# Patient Record
Sex: FEMALE | Employment: UNEMPLOYED | URBAN - METROPOLITAN AREA
[De-identification: names, ages, dates, MRNs, and addresses within clinical notes are randomized per-mention and may not be internally consistent; named-entity substitution may affect disease eponyms.]

---

## 2023-12-26 ENCOUNTER — TELEPHONE (OUTPATIENT)
Dept: ORTHOPEDIC SURGERY | Facility: HOSPITAL | Age: 68
End: 2023-12-26

## 2023-12-26 DIAGNOSIS — Z96.652 S/P TKR (TOTAL KNEE REPLACEMENT) USING CEMENT, LEFT: Primary | ICD-10-CM

## 2023-12-26 RX ORDER — CLINDAMYCIN HYDROCHLORIDE 300 MG/1
600 CAPSULE ORAL ONCE
Qty: 2 CAPSULE | Refills: 4 | Status: SHIPPED | OUTPATIENT
Start: 2023-12-26 | End: 2023-12-26

## 2024-04-30 ENCOUNTER — OFFICE VISIT (OUTPATIENT)
Dept: ORTHOPEDIC SURGERY | Facility: HOSPITAL | Age: 69
End: 2024-04-30
Payer: COMMERCIAL

## 2024-04-30 DIAGNOSIS — M79.642 LEFT HAND PAIN: Primary | ICD-10-CM

## 2024-04-30 PROCEDURE — 1159F MED LIST DOCD IN RCRD: CPT | Performed by: STUDENT IN AN ORGANIZED HEALTH CARE EDUCATION/TRAINING PROGRAM

## 2024-04-30 PROCEDURE — 99203 OFFICE O/P NEW LOW 30 MIN: CPT | Performed by: STUDENT IN AN ORGANIZED HEALTH CARE EDUCATION/TRAINING PROGRAM

## 2024-04-30 PROCEDURE — 99213 OFFICE O/P EST LOW 20 MIN: CPT | Performed by: STUDENT IN AN ORGANIZED HEALTH CARE EDUCATION/TRAINING PROGRAM

## 2024-04-30 RX ORDER — PANTOPRAZOLE SODIUM 40 MG/1
40 TABLET, DELAYED RELEASE ORAL
Status: ON HOLD | COMMUNITY
Start: 2024-04-02 | End: 2024-05-06

## 2024-04-30 RX ORDER — FLUTICASONE PROPIONATE 50 MCG
2 SPRAY, SUSPENSION (ML) NASAL DAILY
COMMUNITY

## 2024-04-30 RX ORDER — TRIAMTERENE AND HYDROCHLOROTHIAZIDE 75; 50 MG/1; MG/1
TABLET ORAL
Status: ON HOLD | COMMUNITY
Start: 2018-07-23 | End: 2024-05-06 | Stop reason: ENTERED-IN-ERROR

## 2024-04-30 RX ORDER — CLINDAMYCIN HYDROCHLORIDE 150 MG/1
CAPSULE ORAL
Status: ON HOLD | COMMUNITY
Start: 2016-05-05 | End: 2024-05-06 | Stop reason: ENTERED-IN-ERROR

## 2024-04-30 RX ORDER — PSEUDOEPHEDRINE HCL 30 MG
2 TABLET ORAL 2 TIMES DAILY
Status: ON HOLD | COMMUNITY
Start: 2024-03-15 | End: 2024-05-06 | Stop reason: ENTERED-IN-ERROR

## 2024-04-30 RX ORDER — PANTOPRAZOLE SODIUM 40 MG/1
40 TABLET, DELAYED RELEASE ORAL 2 TIMES DAILY
Status: ON HOLD | COMMUNITY
End: 2024-05-06 | Stop reason: ENTERED-IN-ERROR

## 2024-04-30 RX ORDER — NADOLOL 40 MG/1
40 TABLET ORAL DAILY
COMMUNITY

## 2024-04-30 RX ORDER — PANTOPRAZOLE SODIUM 20 MG/1
TABLET, DELAYED RELEASE ORAL
Status: ON HOLD | COMMUNITY
Start: 2019-11-21 | End: 2024-05-06 | Stop reason: ENTERED-IN-ERROR

## 2024-04-30 RX ORDER — TRIAMTERENE AND HYDROCHLOROTHIAZIDE 37.5; 25 MG/1; MG/1
1 CAPSULE ORAL
COMMUNITY
Start: 2024-04-02

## 2024-04-30 RX ORDER — CHOLECALCIFEROL (VITAMIN D3) 50 MCG
2000 TABLET ORAL
COMMUNITY

## 2024-04-30 RX ORDER — TOPIRAMATE 25 MG/1
TABLET ORAL
Status: ON HOLD | COMMUNITY
Start: 2019-11-21 | End: 2024-05-06 | Stop reason: ENTERED-IN-ERROR

## 2024-04-30 RX ORDER — ASCORBIC ACID 125 MG
TABLET,CHEWABLE ORAL
COMMUNITY
Start: 2019-11-21

## 2024-04-30 RX ORDER — WARFARIN 10 MG/1
TABLET ORAL
COMMUNITY
Start: 2024-04-22

## 2024-04-30 RX ORDER — CLINDAMYCIN HYDROCHLORIDE 300 MG/1
CAPSULE ORAL
COMMUNITY

## 2024-04-30 RX ORDER — DIPHENHYDRAMINE HYDROCHLORIDE 25 MG/1
CAPSULE ORAL
Status: ON HOLD | COMMUNITY
Start: 2024-02-20 | End: 2024-05-06 | Stop reason: ENTERED-IN-ERROR

## 2024-04-30 RX ORDER — PREDNISONE 20 MG/1
TABLET ORAL
Status: ON HOLD | COMMUNITY
Start: 2024-02-20 | End: 2024-05-06 | Stop reason: ENTERED-IN-ERROR

## 2024-04-30 RX ORDER — NADOLOL 20 MG/1
TABLET ORAL
Status: ON HOLD | COMMUNITY
Start: 2019-11-21 | End: 2024-05-06 | Stop reason: ENTERED-IN-ERROR

## 2024-04-30 RX ORDER — DOXYCYCLINE 100 MG/1
100 CAPSULE ORAL 2 TIMES DAILY
COMMUNITY
Start: 2024-04-10 | End: 2024-04-17

## 2024-04-30 RX ORDER — CALCIUM CARBONATE/VITAMIN D3 500 MG-10
TABLET,CHEWABLE ORAL
COMMUNITY
Start: 2024-03-27

## 2024-04-30 RX ORDER — METHYLPREDNISOLONE 4 MG/1
TABLET ORAL
Status: ON HOLD | COMMUNITY
Start: 2024-01-10 | End: 2024-05-06 | Stop reason: ENTERED-IN-ERROR

## 2024-04-30 RX ORDER — EZETIMIBE 10 MG/1
10 TABLET ORAL
Status: ON HOLD | COMMUNITY
Start: 2023-04-17 | End: 2024-05-06 | Stop reason: ENTERED-IN-ERROR

## 2024-04-30 RX ORDER — OXYCODONE AND ACETAMINOPHEN 5; 325 MG/1; MG/1
TABLET ORAL
Status: ON HOLD | COMMUNITY
Start: 2016-10-13 | End: 2024-05-06 | Stop reason: ENTERED-IN-ERROR

## 2024-04-30 RX ORDER — SILVER SULFADIAZINE 10 G/1000G
CREAM TOPICAL
COMMUNITY
Start: 2018-04-04

## 2024-04-30 ASSESSMENT — PAIN DESCRIPTION - DESCRIPTORS: DESCRIPTORS: ACHING

## 2024-04-30 ASSESSMENT — PAIN SCALES - GENERAL: PAINLEVEL_OUTOF10: 4

## 2024-04-30 ASSESSMENT — PAIN - FUNCTIONAL ASSESSMENT: PAIN_FUNCTIONAL_ASSESSMENT: 0-10

## 2024-05-05 ENCOUNTER — APPOINTMENT (OUTPATIENT)
Dept: RADIOLOGY | Facility: HOSPITAL | Age: 69
End: 2024-05-05
Payer: COMMERCIAL

## 2024-05-05 ENCOUNTER — HOSPITAL ENCOUNTER (OUTPATIENT)
Facility: HOSPITAL | Age: 69
Setting detail: OBSERVATION
LOS: 1 days | Discharge: HOME | End: 2024-05-06
Attending: STUDENT IN AN ORGANIZED HEALTH CARE EDUCATION/TRAINING PROGRAM | Admitting: HOSPITALIST
Payer: COMMERCIAL

## 2024-05-05 ENCOUNTER — APPOINTMENT (OUTPATIENT)
Dept: CARDIOLOGY | Facility: HOSPITAL | Age: 69
End: 2024-05-05
Payer: COMMERCIAL

## 2024-05-05 DIAGNOSIS — I49.8 BIGEMINY: Primary | ICD-10-CM

## 2024-05-05 DIAGNOSIS — R55 NEAR SYNCOPE: ICD-10-CM

## 2024-05-05 PROBLEM — Z86.79 HX OF ATRIAL FLUTTER: Status: ACTIVE | Noted: 2024-05-05

## 2024-05-05 PROBLEM — R42 DIZZINESS: Status: ACTIVE | Noted: 2024-05-05

## 2024-05-05 PROBLEM — R00.1 BRADYCARDIA: Status: ACTIVE | Noted: 2024-05-05

## 2024-05-05 LAB
ANION GAP SERPL CALC-SCNC: 10 MMOL/L (ref 10–20)
APPEARANCE UR: CLEAR
APTT PPP: 39 SECONDS (ref 27–38)
BASOPHILS # BLD AUTO: 0.01 X10*3/UL (ref 0–0.1)
BASOPHILS NFR BLD AUTO: 0.2 %
BILIRUB UR STRIP.AUTO-MCNC: NEGATIVE MG/DL
BNP SERPL-MCNC: 305 PG/ML (ref 0–99)
BUN SERPL-MCNC: 25 MG/DL (ref 6–23)
CALCIUM SERPL-MCNC: 8.8 MG/DL (ref 8.6–10.3)
CARDIAC TROPONIN I PNL SERPL HS: 6 NG/L (ref 0–13)
CARDIAC TROPONIN I PNL SERPL HS: 7 NG/L (ref 0–13)
CHLORIDE SERPL-SCNC: 103 MMOL/L (ref 98–107)
CO2 SERPL-SCNC: 27 MMOL/L (ref 21–32)
COLOR UR: ABNORMAL
CREAT SERPL-MCNC: 0.96 MG/DL (ref 0.5–1.05)
EGFRCR SERPLBLD CKD-EPI 2021: 65 ML/MIN/1.73M*2
EOSINOPHIL # BLD AUTO: 0.1 X10*3/UL (ref 0–0.7)
EOSINOPHIL NFR BLD AUTO: 1.7 %
ERYTHROCYTE [DISTWIDTH] IN BLOOD BY AUTOMATED COUNT: 14.4 % (ref 11.5–14.5)
GLUCOSE SERPL-MCNC: 97 MG/DL (ref 74–99)
GLUCOSE UR STRIP.AUTO-MCNC: NORMAL MG/DL
HCT VFR BLD AUTO: 36.8 % (ref 36–46)
HGB BLD-MCNC: 11.8 G/DL (ref 12–16)
IMM GRANULOCYTES # BLD AUTO: 0.02 X10*3/UL (ref 0–0.7)
IMM GRANULOCYTES NFR BLD AUTO: 0.3 % (ref 0–0.9)
INR PPP: 2.1 (ref 0.9–1.1)
KETONES UR STRIP.AUTO-MCNC: NEGATIVE MG/DL
LACTATE SERPL-SCNC: 0.5 MMOL/L (ref 0.4–2)
LEUKOCYTE ESTERASE UR QL STRIP.AUTO: ABNORMAL
LYMPHOCYTES # BLD AUTO: 2.44 X10*3/UL (ref 1.2–4.8)
LYMPHOCYTES NFR BLD AUTO: 40.6 %
MAGNESIUM SERPL-MCNC: 2.2 MG/DL (ref 1.6–2.4)
MCH RBC QN AUTO: 28.8 PG (ref 26–34)
MCHC RBC AUTO-ENTMCNC: 32.1 G/DL (ref 32–36)
MCV RBC AUTO: 90 FL (ref 80–100)
MONOCYTES # BLD AUTO: 0.42 X10*3/UL (ref 0.1–1)
MONOCYTES NFR BLD AUTO: 7 %
MUCOUS THREADS #/AREA URNS AUTO: NORMAL /LPF
NEUTROPHILS # BLD AUTO: 3.02 X10*3/UL (ref 1.2–7.7)
NEUTROPHILS NFR BLD AUTO: 50.2 %
NITRITE UR QL STRIP.AUTO: NEGATIVE
NRBC BLD-RTO: 0 /100 WBCS (ref 0–0)
PH UR STRIP.AUTO: 6.5 [PH]
PHOSPHATE SERPL-MCNC: 3.4 MG/DL (ref 2.5–4.9)
PLATELET # BLD AUTO: 241 X10*3/UL (ref 150–450)
POTASSIUM SERPL-SCNC: 4 MMOL/L (ref 3.5–5.3)
PROT UR STRIP.AUTO-MCNC: NEGATIVE MG/DL
PROTHROMBIN TIME: 23.7 SECONDS (ref 9.8–12.8)
RBC # BLD AUTO: 4.1 X10*6/UL (ref 4–5.2)
RBC # UR STRIP.AUTO: NEGATIVE /UL
RBC #/AREA URNS AUTO: NORMAL /HPF
SODIUM SERPL-SCNC: 136 MMOL/L (ref 136–145)
SP GR UR STRIP.AUTO: 1.01
SQUAMOUS #/AREA URNS AUTO: NORMAL /HPF
TSH SERPL-ACNC: 0.81 MIU/L (ref 0.44–3.98)
UROBILINOGEN UR STRIP.AUTO-MCNC: NORMAL MG/DL
WBC # BLD AUTO: 6 X10*3/UL (ref 4.4–11.3)
WBC #/AREA URNS AUTO: NORMAL /HPF

## 2024-05-05 PROCEDURE — 85610 PROTHROMBIN TIME: CPT | Performed by: STUDENT IN AN ORGANIZED HEALTH CARE EDUCATION/TRAINING PROGRAM

## 2024-05-05 PROCEDURE — 84484 ASSAY OF TROPONIN QUANT: CPT | Performed by: STUDENT IN AN ORGANIZED HEALTH CARE EDUCATION/TRAINING PROGRAM

## 2024-05-05 PROCEDURE — 81001 URINALYSIS AUTO W/SCOPE: CPT | Performed by: STUDENT IN AN ORGANIZED HEALTH CARE EDUCATION/TRAINING PROGRAM

## 2024-05-05 PROCEDURE — 70450 CT HEAD/BRAIN W/O DYE: CPT | Performed by: RADIOLOGY

## 2024-05-05 PROCEDURE — 71045 X-RAY EXAM CHEST 1 VIEW: CPT

## 2024-05-05 PROCEDURE — 93005 ELECTROCARDIOGRAM TRACING: CPT

## 2024-05-05 PROCEDURE — 70450 CT HEAD/BRAIN W/O DYE: CPT

## 2024-05-05 PROCEDURE — 83605 ASSAY OF LACTIC ACID: CPT | Performed by: STUDENT IN AN ORGANIZED HEALTH CARE EDUCATION/TRAINING PROGRAM

## 2024-05-05 PROCEDURE — 87086 URINE CULTURE/COLONY COUNT: CPT | Mod: AHULAB | Performed by: STUDENT IN AN ORGANIZED HEALTH CARE EDUCATION/TRAINING PROGRAM

## 2024-05-05 PROCEDURE — 36415 COLL VENOUS BLD VENIPUNCTURE: CPT | Performed by: STUDENT IN AN ORGANIZED HEALTH CARE EDUCATION/TRAINING PROGRAM

## 2024-05-05 PROCEDURE — 85025 COMPLETE CBC W/AUTO DIFF WBC: CPT | Performed by: STUDENT IN AN ORGANIZED HEALTH CARE EDUCATION/TRAINING PROGRAM

## 2024-05-05 PROCEDURE — 83735 ASSAY OF MAGNESIUM: CPT | Performed by: STUDENT IN AN ORGANIZED HEALTH CARE EDUCATION/TRAINING PROGRAM

## 2024-05-05 PROCEDURE — 84100 ASSAY OF PHOSPHORUS: CPT | Performed by: STUDENT IN AN ORGANIZED HEALTH CARE EDUCATION/TRAINING PROGRAM

## 2024-05-05 PROCEDURE — 83880 ASSAY OF NATRIURETIC PEPTIDE: CPT | Performed by: STUDENT IN AN ORGANIZED HEALTH CARE EDUCATION/TRAINING PROGRAM

## 2024-05-05 PROCEDURE — 85730 THROMBOPLASTIN TIME PARTIAL: CPT | Performed by: STUDENT IN AN ORGANIZED HEALTH CARE EDUCATION/TRAINING PROGRAM

## 2024-05-05 PROCEDURE — 84443 ASSAY THYROID STIM HORMONE: CPT | Performed by: STUDENT IN AN ORGANIZED HEALTH CARE EDUCATION/TRAINING PROGRAM

## 2024-05-05 PROCEDURE — 99285 EMERGENCY DEPT VISIT HI MDM: CPT | Mod: 25

## 2024-05-05 PROCEDURE — 1210000001 HC SEMI-PRIVATE ROOM DAILY

## 2024-05-05 PROCEDURE — 99223 1ST HOSP IP/OBS HIGH 75: CPT | Performed by: PHYSICIAN ASSISTANT

## 2024-05-05 PROCEDURE — 80048 BASIC METABOLIC PNL TOTAL CA: CPT | Performed by: STUDENT IN AN ORGANIZED HEALTH CARE EDUCATION/TRAINING PROGRAM

## 2024-05-05 ASSESSMENT — COLUMBIA-SUICIDE SEVERITY RATING SCALE - C-SSRS
1. IN THE PAST MONTH, HAVE YOU WISHED YOU WERE DEAD OR WISHED YOU COULD GO TO SLEEP AND NOT WAKE UP?: NO
6. HAVE YOU EVER DONE ANYTHING, STARTED TO DO ANYTHING, OR PREPARED TO DO ANYTHING TO END YOUR LIFE?: NO
2. HAVE YOU ACTUALLY HAD ANY THOUGHTS OF KILLING YOURSELF?: NO

## 2024-05-05 NOTE — ED TRIAGE NOTES
Pt presents to ed via self for a complaint of bradycardia and weakness. Pt presents GCS 15, has a patent airway and does not appear in distress. Pt reports hx of afib/flutter, and reports while she went to stand up she felt light headed and dizzy. Pt at time of episode had a palpated radial pulse of 40.

## 2024-05-06 VITALS
RESPIRATION RATE: 16 BRPM | BODY MASS INDEX: 51.91 KG/M2 | HEART RATE: 64 BPM | WEIGHT: 293 LBS | TEMPERATURE: 97.8 F | DIASTOLIC BLOOD PRESSURE: 57 MMHG | HEIGHT: 63 IN | SYSTOLIC BLOOD PRESSURE: 124 MMHG | OXYGEN SATURATION: 100 %

## 2024-05-06 LAB
ANION GAP SERPL CALC-SCNC: 11 MMOL/L (ref 10–20)
BUN SERPL-MCNC: 20 MG/DL (ref 6–23)
CALCIUM SERPL-MCNC: 8.4 MG/DL (ref 8.6–10.3)
CARDIAC TROPONIN I PNL SERPL HS: 6 NG/L (ref 0–13)
CARDIAC TROPONIN I PNL SERPL HS: 6 NG/L (ref 0–13)
CHLORIDE SERPL-SCNC: 104 MMOL/L (ref 98–107)
CO2 SERPL-SCNC: 26 MMOL/L (ref 21–32)
CREAT SERPL-MCNC: 0.79 MG/DL (ref 0.5–1.05)
EGFRCR SERPLBLD CKD-EPI 2021: 83 ML/MIN/1.73M*2
ERYTHROCYTE [DISTWIDTH] IN BLOOD BY AUTOMATED COUNT: 14.5 % (ref 11.5–14.5)
GLUCOSE SERPL-MCNC: 72 MG/DL (ref 74–99)
HCT VFR BLD AUTO: 36.3 % (ref 36–46)
HGB BLD-MCNC: 12 G/DL (ref 12–16)
HOLD SPECIMEN: NORMAL
MCH RBC QN AUTO: 29.5 PG (ref 26–34)
MCHC RBC AUTO-ENTMCNC: 33.1 G/DL (ref 32–36)
MCV RBC AUTO: 89 FL (ref 80–100)
NRBC BLD-RTO: 0 /100 WBCS (ref 0–0)
PLATELET # BLD AUTO: 238 X10*3/UL (ref 150–450)
POTASSIUM SERPL-SCNC: 4 MMOL/L (ref 3.5–5.3)
RBC # BLD AUTO: 4.07 X10*6/UL (ref 4–5.2)
SODIUM SERPL-SCNC: 137 MMOL/L (ref 136–145)
WBC # BLD AUTO: 5.5 X10*3/UL (ref 4.4–11.3)

## 2024-05-06 PROCEDURE — 36415 COLL VENOUS BLD VENIPUNCTURE: CPT | Performed by: PHYSICIAN ASSISTANT

## 2024-05-06 PROCEDURE — 2500000001 HC RX 250 WO HCPCS SELF ADMINISTERED DRUGS (ALT 637 FOR MEDICARE OP): Performed by: PHYSICIAN ASSISTANT

## 2024-05-06 PROCEDURE — 80048 BASIC METABOLIC PNL TOTAL CA: CPT | Performed by: PHYSICIAN ASSISTANT

## 2024-05-06 PROCEDURE — 71045 X-RAY EXAM CHEST 1 VIEW: CPT | Performed by: RADIOLOGY

## 2024-05-06 PROCEDURE — G0378 HOSPITAL OBSERVATION PER HR: HCPCS

## 2024-05-06 PROCEDURE — 99222 1ST HOSP IP/OBS MODERATE 55: CPT | Performed by: INTERNAL MEDICINE

## 2024-05-06 PROCEDURE — 84484 ASSAY OF TROPONIN QUANT: CPT | Mod: 91 | Performed by: NURSE PRACTITIONER

## 2024-05-06 PROCEDURE — 85027 COMPLETE CBC AUTOMATED: CPT | Performed by: PHYSICIAN ASSISTANT

## 2024-05-06 PROCEDURE — 99239 HOSP IP/OBS DSCHRG MGMT >30: CPT | Performed by: HOSPITALIST

## 2024-05-06 PROCEDURE — 36415 COLL VENOUS BLD VENIPUNCTURE: CPT | Performed by: NURSE PRACTITIONER

## 2024-05-06 PROCEDURE — 2500000006 HC RX 250 W HCPCS SELF ADMINISTERED DRUGS (ALT 637 FOR ALL PAYERS): Performed by: PHYSICIAN ASSISTANT

## 2024-05-06 RX ORDER — ONDANSETRON 4 MG/1
4 TABLET, ORALLY DISINTEGRATING ORAL EVERY 8 HOURS PRN
Status: DISCONTINUED | OUTPATIENT
Start: 2024-05-06 | End: 2024-05-06 | Stop reason: HOSPADM

## 2024-05-06 RX ORDER — PANTOPRAZOLE SODIUM 40 MG/1
40 TABLET, DELAYED RELEASE ORAL
Status: DISCONTINUED | OUTPATIENT
Start: 2024-05-06 | End: 2024-05-06 | Stop reason: HOSPADM

## 2024-05-06 RX ORDER — ACETAMINOPHEN 325 MG/1
650 TABLET ORAL EVERY 4 HOURS PRN
Status: DISCONTINUED | OUTPATIENT
Start: 2024-05-06 | End: 2024-05-06 | Stop reason: HOSPADM

## 2024-05-06 RX ORDER — ONDANSETRON HYDROCHLORIDE 2 MG/ML
4 INJECTION, SOLUTION INTRAVENOUS EVERY 8 HOURS PRN
Status: DISCONTINUED | OUTPATIENT
Start: 2024-05-06 | End: 2024-05-06 | Stop reason: HOSPADM

## 2024-05-06 RX ORDER — ACETAMINOPHEN 160 MG/5ML
650 SOLUTION ORAL EVERY 4 HOURS PRN
Status: DISCONTINUED | OUTPATIENT
Start: 2024-05-06 | End: 2024-05-06 | Stop reason: HOSPADM

## 2024-05-06 RX ORDER — PANTOPRAZOLE SODIUM 40 MG/1
40 TABLET, DELAYED RELEASE ORAL
Qty: 60 TABLET | Refills: 0 | Status: SHIPPED | OUTPATIENT
Start: 2024-05-06 | End: 2024-06-05

## 2024-05-06 RX ORDER — TRIAMTERENE/HYDROCHLOROTHIAZID 37.5-25 MG
1 TABLET ORAL DAILY
Status: DISCONTINUED | OUTPATIENT
Start: 2024-05-06 | End: 2024-05-06 | Stop reason: HOSPADM

## 2024-05-06 RX ORDER — CHOLECALCIFEROL (VITAMIN D3) 25 MCG
2000 TABLET ORAL DAILY
Status: DISCONTINUED | OUTPATIENT
Start: 2024-05-06 | End: 2024-05-06 | Stop reason: HOSPADM

## 2024-05-06 RX ORDER — WARFARIN SODIUM 5 MG/1
5 TABLET ORAL DAILY
Status: DISCONTINUED | OUTPATIENT
Start: 2024-05-06 | End: 2024-05-06 | Stop reason: HOSPADM

## 2024-05-06 RX ORDER — ACETAMINOPHEN 650 MG/1
650 SUPPOSITORY RECTAL EVERY 4 HOURS PRN
Status: DISCONTINUED | OUTPATIENT
Start: 2024-05-06 | End: 2024-05-06 | Stop reason: HOSPADM

## 2024-05-06 RX ORDER — NADOLOL 40 MG/1
40 TABLET ORAL NIGHTLY
Status: DISCONTINUED | OUTPATIENT
Start: 2024-05-06 | End: 2024-05-06 | Stop reason: HOSPADM

## 2024-05-06 RX ORDER — TALC
3 POWDER (GRAM) TOPICAL NIGHTLY PRN
Status: DISCONTINUED | OUTPATIENT
Start: 2024-05-06 | End: 2024-05-06 | Stop reason: HOSPADM

## 2024-05-06 RX ADMIN — Medication 2000 UNITS: at 10:20

## 2024-05-06 RX ADMIN — PANTOPRAZOLE SODIUM 40 MG: 40 TABLET, DELAYED RELEASE ORAL at 06:58

## 2024-05-06 RX ADMIN — TRIAMTERENE AND HYDROCHLOROTHIAZIDE 1 TABLET: 37.5; 25 TABLET ORAL at 10:20

## 2024-05-06 RX ADMIN — WARFARIN SODIUM 5 MG: 5 TABLET ORAL at 01:45

## 2024-05-06 SDOH — ECONOMIC STABILITY: INCOME INSECURITY: IN THE LAST 12 MONTHS, WAS THERE A TIME WHEN YOU WERE NOT ABLE TO PAY THE MORTGAGE OR RENT ON TIME?: NO

## 2024-05-06 SDOH — HEALTH STABILITY: MENTAL HEALTH: HOW MANY STANDARD DRINKS CONTAINING ALCOHOL DO YOU HAVE ON A TYPICAL DAY?: PATIENT DOES NOT DRINK

## 2024-05-06 SDOH — SOCIAL STABILITY: SOCIAL INSECURITY: HAVE YOU HAD THOUGHTS OF HARMING ANYONE ELSE?: NO

## 2024-05-06 SDOH — SOCIAL STABILITY: SOCIAL INSECURITY: DO YOU FEEL UNSAFE GOING BACK TO THE PLACE WHERE YOU ARE LIVING?: NO

## 2024-05-06 SDOH — SOCIAL STABILITY: SOCIAL INSECURITY: WERE YOU ABLE TO COMPLETE ALL THE BEHAVIORAL HEALTH SCREENINGS?: YES

## 2024-05-06 SDOH — ECONOMIC STABILITY: TRANSPORTATION INSECURITY
IN THE PAST 12 MONTHS, HAS THE LACK OF TRANSPORTATION KEPT YOU FROM MEDICAL APPOINTMENTS OR FROM GETTING MEDICATIONS?: NO

## 2024-05-06 SDOH — ECONOMIC STABILITY: FOOD INSECURITY: WITHIN THE PAST 12 MONTHS, YOU WORRIED THAT YOUR FOOD WOULD RUN OUT BEFORE YOU GOT MONEY TO BUY MORE.: NEVER TRUE

## 2024-05-06 SDOH — SOCIAL STABILITY: SOCIAL INSECURITY: DO YOU FEEL ANYONE HAS EXPLOITED OR TAKEN ADVANTAGE OF YOU FINANCIALLY OR OF YOUR PERSONAL PROPERTY?: NO

## 2024-05-06 SDOH — HEALTH STABILITY: MENTAL HEALTH: HOW OFTEN DO YOU HAVE 6 OR MORE DRINKS ON ONE OCCASION?: NEVER

## 2024-05-06 SDOH — SOCIAL STABILITY: SOCIAL INSECURITY: DOES ANYONE TRY TO KEEP YOU FROM HAVING/CONTACTING OTHER FRIENDS OR DOING THINGS OUTSIDE YOUR HOME?: NO

## 2024-05-06 SDOH — HEALTH STABILITY: MENTAL HEALTH: HOW OFTEN DO YOU HAVE A DRINK CONTAINING ALCOHOL?: NEVER

## 2024-05-06 SDOH — ECONOMIC STABILITY: HOUSING INSECURITY
IN THE LAST 12 MONTHS, WAS THERE A TIME WHEN YOU DID NOT HAVE A STEADY PLACE TO SLEEP OR SLEPT IN A SHELTER (INCLUDING NOW)?: NO

## 2024-05-06 SDOH — SOCIAL STABILITY: SOCIAL INSECURITY: ABUSE: ADULT

## 2024-05-06 SDOH — ECONOMIC STABILITY: HOUSING INSECURITY: IN THE LAST 12 MONTHS, HOW MANY PLACES HAVE YOU LIVED?: 1

## 2024-05-06 SDOH — ECONOMIC STABILITY: TRANSPORTATION INSECURITY
IN THE PAST 12 MONTHS, HAS LACK OF TRANSPORTATION KEPT YOU FROM MEETINGS, WORK, OR FROM GETTING THINGS NEEDED FOR DAILY LIVING?: NO

## 2024-05-06 SDOH — ECONOMIC STABILITY: INCOME INSECURITY: IN THE PAST 12 MONTHS, HAS THE ELECTRIC, GAS, OIL, OR WATER COMPANY THREATENED TO SHUT OFF SERVICE IN YOUR HOME?: NO

## 2024-05-06 SDOH — SOCIAL STABILITY: SOCIAL NETWORK: ARE YOU MARRIED, WIDOWED, DIVORCED, SEPARATED, NEVER MARRIED, OR LIVING WITH A PARTNER?: LIVING WITH PARTNER

## 2024-05-06 SDOH — ECONOMIC STABILITY: FOOD INSECURITY: WITHIN THE PAST 12 MONTHS, THE FOOD YOU BOUGHT JUST DIDN'T LAST AND YOU DIDN'T HAVE MONEY TO GET MORE.: NEVER TRUE

## 2024-05-06 SDOH — SOCIAL STABILITY: SOCIAL INSECURITY: ARE YOU OR HAVE YOU BEEN THREATENED OR ABUSED PHYSICALLY, EMOTIONALLY, OR SEXUALLY BY ANYONE?: NO

## 2024-05-06 SDOH — SOCIAL STABILITY: SOCIAL INSECURITY: HAS ANYONE EVER THREATENED TO HURT YOUR FAMILY OR YOUR PETS?: NO

## 2024-05-06 SDOH — ECONOMIC STABILITY: INCOME INSECURITY: HOW HARD IS IT FOR YOU TO PAY FOR THE VERY BASICS LIKE FOOD, HOUSING, MEDICAL CARE, AND HEATING?: NOT HARD AT ALL

## 2024-05-06 SDOH — SOCIAL STABILITY: SOCIAL INSECURITY: ARE THERE ANY APPARENT SIGNS OF INJURIES/BEHAVIORS THAT COULD BE RELATED TO ABUSE/NEGLECT?: NO

## 2024-05-06 ASSESSMENT — LIFESTYLE VARIABLES
PRESCIPTION_ABUSE_PAST_12_MONTHS: NO
SKIP TO QUESTIONS 9-10: 1
HOW MANY STANDARD DRINKS CONTAINING ALCOHOL DO YOU HAVE ON A TYPICAL DAY: PATIENT DOES NOT DRINK
SKIP TO QUESTIONS 9-10: 1
HOW OFTEN DO YOU HAVE A DRINK CONTAINING ALCOHOL: NEVER
HOW OFTEN DO YOU HAVE 6 OR MORE DRINKS ON ONE OCCASION: NEVER
AUDIT-C TOTAL SCORE: 0
AUDIT-C TOTAL SCORE: 0
SUBSTANCE_ABUSE_PAST_12_MONTHS: NO
AUDIT-C TOTAL SCORE: 0

## 2024-05-06 ASSESSMENT — COGNITIVE AND FUNCTIONAL STATUS - GENERAL
PATIENT BASELINE BEDBOUND: NO
DAILY ACTIVITIY SCORE: 24
DAILY ACTIVITIY SCORE: 24
MOBILITY SCORE: 24
MOBILITY SCORE: 24

## 2024-05-06 ASSESSMENT — ACTIVITIES OF DAILY LIVING (ADL)
WALKS IN HOME: INDEPENDENT
LACK_OF_TRANSPORTATION: NO
ASSISTIVE_DEVICE: WALKER
GROOMING: INDEPENDENT
TOILETING: INDEPENDENT
DRESSING YOURSELF: INDEPENDENT
BATHING: INDEPENDENT
LACK_OF_TRANSPORTATION: NO
HEARING - RIGHT EAR: FUNCTIONAL
FEEDING YOURSELF: INDEPENDENT
ADEQUATE_TO_COMPLETE_ADL: YES
PATIENT'S MEMORY ADEQUATE TO SAFELY COMPLETE DAILY ACTIVITIES?: YES
HEARING - LEFT EAR: FUNCTIONAL
JUDGMENT_ADEQUATE_SAFELY_COMPLETE_DAILY_ACTIVITIES: YES

## 2024-05-06 ASSESSMENT — COLUMBIA-SUICIDE SEVERITY RATING SCALE - C-SSRS
6. HAVE YOU EVER DONE ANYTHING, STARTED TO DO ANYTHING, OR PREPARED TO DO ANYTHING TO END YOUR LIFE?: NO
2. HAVE YOU ACTUALLY HAD ANY THOUGHTS OF KILLING YOURSELF?: NO
1. IN THE PAST MONTH, HAVE YOU WISHED YOU WERE DEAD OR WISHED YOU COULD GO TO SLEEP AND NOT WAKE UP?: NO

## 2024-05-06 ASSESSMENT — PATIENT HEALTH QUESTIONNAIRE - PHQ9
1. LITTLE INTEREST OR PLEASURE IN DOING THINGS: NOT AT ALL
SUM OF ALL RESPONSES TO PHQ9 QUESTIONS 1 & 2: 0
2. FEELING DOWN, DEPRESSED OR HOPELESS: NOT AT ALL

## 2024-05-06 ASSESSMENT — PAIN - FUNCTIONAL ASSESSMENT: PAIN_FUNCTIONAL_ASSESSMENT: 0-10

## 2024-05-06 ASSESSMENT — PAIN SCALES - GENERAL
PAINLEVEL_OUTOF10: 0 - NO PAIN
PAINLEVEL_OUTOF10: 0 - NO PAIN

## 2024-05-06 NOTE — NURSING NOTE

## 2024-05-06 NOTE — CARE PLAN
The patient's goals for the shift include      The clinical goals for the shift include hemodynamically stable      Problem: Pain - Adult  Goal: Verbalizes/displays adequate comfort level or baseline comfort level  Outcome: Progressing     Problem: Safety - Adult  Goal: Free from fall injury  Outcome: Progressing     Problem: Pain  Goal: My pain/discomfort is manageable  Outcome: Progressing     Problem: Safety  Goal: Patient will be injury free during hospitalization  Outcome: Progressing  Goal: I will remain free of falls  Outcome: Progressing     Problem: Daily Care  Goal: Daily care needs are met  Outcome: Progressing     Problem: Psychosocial Needs  Goal: Demonstrates ability to cope with hospitalization/illness  Outcome: Progressing  Goal: Collaborate with me, my family, and caregiver to identify my specific goals  Outcome: Progressing     Problem: Discharge Barriers  Goal: My discharge needs are met  Outcome: Progressing

## 2024-05-06 NOTE — H&P
History Of Present Illness  Maddy Sanchez is a 68 y.o. female presenting with dizziness, near syncope, bigeminy new onset.  Patient with a history of a flutter, hypertension and hyperlipidemia as well as DVT and PE on Coumadin is being admitted for weakness and dizziness and new onset bigeminy.  Patient actually was in the emergency department because she brought her sister in to be seen.  Patient states she started to feel weak and dizzy and stood up and felt very dizzy and woozy.  The nurse took her heart rate and it was apparently 33.  They put her in a room and did an EKG and was found to be in bigeminy.  Patient did not pass out.  Her symptoms all lasted about an hour and she feels fine now but she is still in bigeminy.  Denies chest pain or shortness of breath or any other symptoms.    Past medical history: Hypertension, hyperlipidemia, lymphedema in both eyes, a flutter, gastric bypass, DVT, PE, lumbar radiculopathy, chronic low back pain, bilateral knee pain, osteoarthritis, GERD, obstructive sleep apnea on CPAP, venous stasis ulcers    Medications: Protonix, triamterene/hydrochlorothiazide, multivitamin, D3, Coumadin, nadolol    Allergies: Statins, adhesive, latex, aspirin, NSAIDs    Social history: Denies tobacco or alcohol use    ED course.  Patient had 2 EKGs basically showing sinus rhythm with first-degree AV block and PVCs in a bigeminal pattern with a right bundle branch block and a heart rate of 68.  Her most recent EKG for comparison was 2016 when she had sinus bradycardia with first-degree AV block but no bigeminy and no right bundle branch block.  She does see a cardiologist at the [facility].  Patient's metabolic panel was within normal limits other than BUN of 25.  BNP was 305  Troponin #1 was 6, #2 was 7  TSH is 0.81  INR is therapeutic at 2.1  CBC was unremarkable.  And within normal limits other than a hemoglobin of 11.8.  Patient's urinalysis showed 25 leukocytes but was  otherwise negative  CT of the head showed no acute process       No past medical history on file.  No past surgical history on file.        Family History  No family history on file.     Allergies  Atenolol; Zsoxa-zwthq-amgexft-pramoxine; Nsaids (non-steroidal anti-inflammatory drug); Statins-hmg-coa reductase inhibitors; Adhesive tape-silicones; Aspirin; and Latex, natural rubber    Review of Systems  Patient denies chest pain, shortness of breath, nausea, vomiting, fever, chills, diarrhea, constipation,  vision changes, rashes, dysuria, paresthesias, vertigo, headache, cough or cold symptoms, or any other complaints at this time. A complete review of systems was done, and is as stated in the history of present illness, is otherwise negative or not pertinent to the complaint.    Physical Exam  Physical exam: Vital signs and nurses notes were reviewed.    General:  no acute distress. Alert and oriented  x 4.  Talks easily in full sentences    Head: atraumatic and normocephalic    Eyes: Pupils equal round reactive to light, EOMs are intact, conjunctivae is not injected.    Oropharynx: No erythema or exudate noted, no trismus or drooling, buccal mucosa is moist.    Ears:  normal external exam, no swelling or erythema,     Nasal: normal external exam,     Neck: Supple, full range of motion,     Cardiac: Regular rate and rhythm.  Systolic murmur noted    Pulmonary: Lungs clear bilaterally with good aeration. No adventitious breath sounds. No wheezes rales or rhonchi. No accessory muscle use no retraction noted.    Abdomen: Soft,  Nontender. No guarding, rigidity, or distention. Normoactive bowel sounds. No pulsatile masses, no bruits.     Extremities:  Full range of motion.  Lymphedema in both eyes chronic, no pitting edema in the lower legs    Skin: No rash seen. Skin is warm and dry     Neuro: Patient is alert and oriented x4. Speech is clear. There is no asymmetry with facial grimaces, and no tongue deviation.  Patient moves all extremities independently. Sensation is intact. No obvious neuro deficits are noted.     Last Recorded Vitals  /76   Pulse 75   Temp 36.3 °C (97.4 °F) (Temporal)   Resp 15   Wt 143 kg (314 lb 3.2 oz)   SpO2 100%     Relevant Results  Scheduled medications    Continuous medications    PRN medications      Results for orders placed or performed during the hospital encounter of 05/05/24 (from the past 24 hour(s))   CBC and Auto Differential   Result Value Ref Range    WBC 6.0 4.4 - 11.3 x10*3/uL    nRBC 0.0 0.0 - 0.0 /100 WBCs    RBC 4.10 4.00 - 5.20 x10*6/uL    Hemoglobin 11.8 (L) 12.0 - 16.0 g/dL    Hematocrit 36.8 36.0 - 46.0 %    MCV 90 80 - 100 fL    MCH 28.8 26.0 - 34.0 pg    MCHC 32.1 32.0 - 36.0 g/dL    RDW 14.4 11.5 - 14.5 %    Platelets 241 150 - 450 x10*3/uL    Neutrophils % 50.2 40.0 - 80.0 %    Immature Granulocytes %, Automated 0.3 0.0 - 0.9 %    Lymphocytes % 40.6 13.0 - 44.0 %    Monocytes % 7.0 2.0 - 10.0 %    Eosinophils % 1.7 0.0 - 6.0 %    Basophils % 0.2 0.0 - 2.0 %    Neutrophils Absolute 3.02 1.20 - 7.70 x10*3/uL    Immature Granulocytes Absolute, Automated 0.02 0.00 - 0.70 x10*3/uL    Lymphocytes Absolute 2.44 1.20 - 4.80 x10*3/uL    Monocytes Absolute 0.42 0.10 - 1.00 x10*3/uL    Eosinophils Absolute 0.10 0.00 - 0.70 x10*3/uL    Basophils Absolute 0.01 0.00 - 0.10 x10*3/uL   Basic metabolic panel   Result Value Ref Range    Glucose 97 74 - 99 mg/dL    Sodium 136 136 - 145 mmol/L    Potassium 4.0 3.5 - 5.3 mmol/L    Chloride 103 98 - 107 mmol/L    Bicarbonate 27 21 - 32 mmol/L    Anion Gap 10 10 - 20 mmol/L    Urea Nitrogen 25 (H) 6 - 23 mg/dL    Creatinine 0.96 0.50 - 1.05 mg/dL    eGFR 65 >60 mL/min/1.73m*2    Calcium 8.8 8.6 - 10.3 mg/dL   Phosphorus   Result Value Ref Range    Phosphorus 3.4 2.5 - 4.9 mg/dL   Magnesium   Result Value Ref Range    Magnesium 2.20 1.60 - 2.40 mg/dL   Lactate   Result Value Ref Range    Lactate 0.5 0.4 - 2.0 mmol/L   Protime-INR    Result Value Ref Range    Protime 23.7 (H) 9.8 - 12.8 seconds    INR 2.1 (H) 0.9 - 1.1   aPTT   Result Value Ref Range    aPTT 39 (H) 27 - 38 seconds   B-Type Natriuretic Peptide   Result Value Ref Range     (H) 0 - 99 pg/mL   TSH with reflex to Free T4 if abnormal   Result Value Ref Range    Thyroid Stimulating Hormone 0.81 0.44 - 3.98 mIU/L   Troponin I, High Sensitivity, Initial   Result Value Ref Range    Troponin I, High Sensitivity 6 0 - 13 ng/L   Troponin, High Sensitivity, 1 Hour   Result Value Ref Range    Troponin I, High Sensitivity 7 0 - 13 ng/L   Urinalysis with Reflex Culture and Microscopic   Result Value Ref Range    Color, Urine Light-Yellow Light-Yellow, Yellow, Dark-Yellow    Appearance, Urine Clear Clear    Specific Gravity, Urine 1.012 1.005 - 1.035    pH, Urine 6.5 5.0, 5.5, 6.0, 6.5, 7.0, 7.5, 8.0    Protein, Urine NEGATIVE NEGATIVE, 10 (TRACE), 20 (TRACE) mg/dL    Glucose, Urine Normal Normal mg/dL    Blood, Urine NEGATIVE NEGATIVE    Ketones, Urine NEGATIVE NEGATIVE mg/dL    Bilirubin, Urine NEGATIVE NEGATIVE    Urobilinogen, Urine Normal Normal mg/dL    Nitrite, Urine NEGATIVE NEGATIVE    Leukocyte Esterase, Urine 25 Dilcia/µL (A) NEGATIVE   Microscopic Only, Urine   Result Value Ref Range    WBC, Urine 1-5 1-5, NONE /HPF    RBC, Urine 1-2 NONE, 1-2, 3-5 /HPF    Squamous Epithelial Cells, Urine 1-9 (SPARSE) Reference range not established. /HPF    Mucus, Urine FEW Reference range not established. /LPF     CT head wo IV contrast   Final Result   No acute intracranial abnormality.        Chronic changes as noted above.        MACRO:   None        Signed by: Kasey Dumont 5/5/2024 10:14 PM   Dictation workstation:   [id number]      Transthoracic Echo (TTE) Complete    (Results Pending)          Assessment/Plan   Principal Problem:    Bigeminy  Active Problems:    Dizziness    Bradycardia    Near syncope    Hx of atrial flutter      Plan: Cardiology consult  Echocardiogram  ordered,  Chest x-ray ordered,  Home medications ordered but the nadolol is held secondary to the bradycardia, near syncope,  CBC and BMP daily, Tylenol or Zofran as needed,  Findings, orders, plan discussed with Dr. Solo Sethi PA-C

## 2024-05-06 NOTE — CONSULTS
"Inpatient consult to Cardiology  Consult performed by: JERRY Vallejo-CNP  Consult ordered by: Jennifer Sethi PA-C  Reason for consult: bradycardia/dizziness      History Of Present Illness:    Maddy Sanchez is a 68 y.o. female presenting with dizziness. She was in the emergency room with her sister when she had she suddenly felt like she was \"floating\" that initially occurred sitting down then was worse when she stood up. She reports a nurse took her heart rate at which time it was 33. She has no further sensation of feeling like she was \"floating.\" No near syncope or syncope. She does now this morning have a mild mid sternal chest heaviness along with fluttering sensation that has been constant for 30 minutes. She noticed the chest pain after she came back from the bathroom and sat up for awhile. Denies any associated symptoms nausea, vomiting, diaphoresis, or dyspnea. She will have this discomfort occasionally approximately once a week over the past couple years and remains unchanged. She has chronic mild dyspnea on exertion when doing a lot of housework. She also noted sinus pressure this morning. She wears CPAP every night. Denies orthopnea, pnd, syncope, palpitations, or bleeding issues.       She is followed by cardiologist Dr. Berry Pickard at [facility]. Per review of last note 7/3/23-  who had past history of severe allergic reaction with anaphylaxis with Zio patch that was not removed as the reaction was getting worse from redness to hives to extreme itching and syncope and anaphylaxis requiring being taken to the hospital by her . She has had recovery, and we advised not to use any type of adhesive at testing is on her in the future.      Last Recorded Vitals:  Vitals:    05/05/24 2300 05/05/24 2330 05/06/24 0048 05/06/24 0112   BP: 102/76 133/80 154/85 154/85   BP Location:       Patient Position:       Pulse: 75 70 73 73   Resp: 15 17 18 18   Temp:   36.9 °C (98.4 °F) 36.9 °C (98.4 °F) " "  TempSrc:    Temporal   SpO2: 100% 99% 99% 97%   Weight:    143 kg (315 lb 4.1 oz)   Height:    1.6 m (5' 2.99\")       Last Labs:  LABS:  CMP:  Results from last 7 days   Lab Units 05/05/24  1803   SODIUM mmol/L 136   POTASSIUM mmol/L 4.0   CHLORIDE mmol/L 103   CO2 mmol/L 27   ANION GAP mmol/L 10   BUN mg/dL 25*   CREATININE mg/dL 0.96   EGFR mL/min/1.73m*2 65   MAGNESIUM mg/dL 2.20     CBC:  Results from last 7 days   Lab Units 05/05/24  1803   WBC AUTO x10*3/uL 6.0   HEMOGLOBIN g/dL 11.8*   HEMATOCRIT % 36.8   PLATELETS AUTO x10*3/uL 241   MCV fL 90     COAG:   Results from last 7 days   Lab Units 05/05/24  1803   INR  2.1*     ABO: No results found for: \"ABO\"  HEME/ENDO:  Results from last 7 days   Lab Units 05/05/24  1803   TSH mIU/L 0.81      CARDIAC:   Results from last 7 days   Lab Units 05/05/24  1857 05/05/24  1803   TROPHS ng/L 7 6   BNP pg/mL  --  305*     FULL LIPID PROFILE  Specimen: Blood - Blood specimen (specimen)  Component  Ref Range & Units 2 mo ago   Cholesterol  <200 mg/dL 253 High    Triglycerides  <151 mg/dL 141   HDL Cholesterol  >54 mg/dL 65   LDL cholesterol  <111 mg/dL 170 High    Non-HDL Cholesterol  <130 mg/dL 188 High    Chol/HDL Ratio  <5.00 3.89   LDL/HDL Ratio  <3.57 2.62   Resulting Agency [facility]   Specimen Collected: 02/28/24 12:15    Performed by: [facility] Last Resulted: 02/28/24 17:11   Received From: [facility]  Result Received: 04/30/24 13:20      Last I/O:  No intake/output data recorded.    Past Cardiology Tests (Last 3 Years):  EKG:  ECG 12 lead 05/05/2024 (Preliminary)  Reviewed EKG from admit- normal sinus rhythm with first degree AV block with bigeminy PVCs, RBBB HR 68  Reviewed EKG from admit- normal sinus rhythm with first degree AV block, with bigeminy PVCs, RBBB HR 67    Echo:  No results found for this or any previous visit from the past 1095 days.  ECHO  7/3/23  Impression  Performed by [facility]  CONCLUSIONS: "   - Technically difficult exam due to body habitus and suboptimal positioning.   - Exam indication: Nonsustained atrial fibrillation     - The left ventricle is normal in size. There is mild left ventricular   hypertrophy. Left ventricular systolic function is normal. EF = 62 ± 5% (3D) Grade    II left ventricular diastolic dysfunction. Normal GLS (-21.7%).   - The right ventricle is mildly dilated. Right ventricular systolic function is   low normal.   - The left atrial cavity is moderately dilated.   - The right atrial cavity is mildly dilated.   - Borderline dilated aorta: mid-ascending 3.8cm, distal ascending 3.9cm.   - Mild (1-2+) tricuspid regurgitation. Mild (1+) mitral regurgitation.   - There is mild aortic valve stenosis with mild (trace-1+) aortic regurgitation.   AV area is 1.59 cm² (0.62 cm²/m²) by continuity, VTI, AV mean 17mmHg, AV peak   Velocity 2.84m/s. Prior pk/mn gradients were 32/17 mmHg.   - Estimated RVSP 48mmHg consistent with mild pulmonary Hypertension. Estimated RA   pressure elevated.     - Exam was compared with the prior  echocardiographic exam performed on 4/13/21   echo report comparison only: Prior RVSP 40.       Electronically signed by Aram Cullen MD on 7/3/2023 at 11:08:08 AM      XR chest 1 view   Final Result   1. Cardiomegaly and mild central vascular congestion.   2. Hazy left basilar opacity which may represent atelectasis and   edema versus airspace disease.             Signed by: Kory Scherer 5/6/2024 12:30 AM   Dictation workstation:   [id number]      CT head wo IV contrast   Final Result   No acute intracranial abnormality.        Chronic changes as noted above.        MACRO:   None        Signed by: Kasey Dumont 5/5/2024 10:14 PM   Dictation workstation:   [id number]      Transthoracic Echo (TTE) Complete    (Results Pending)         Past Medical History:  She has no past medical history on file.    Past Surgical History:  She has no past surgical history  on file.      Social History:  She reports that she has never smoked. She has never used smokeless tobacco. No history on file for alcohol use and drug use.    Family History:  No family history on file.     Allergies:  Atenolol; Pisrk-yzuic-iktajxl-pramoxine; Nsaids (non-steroidal anti-inflammatory drug); Statins-hmg-coa reductase inhibitors; Adhesive tape-silicones; Aspirin; and Latex, natural rubber    Inpatient Medications:  Scheduled medications   Medication Dose Route Frequency   • cholecalciferol  2,000 Units oral Daily   • [Held by provider] nadolol  40 mg oral Nightly   • pantoprazole  40 mg oral Daily before breakfast   • perflutren lipid microspheres  0.5-10 mL of dilution intravenous Once in imaging   • perflutren protein A microsphere  0.5 mL intravenous Once in imaging   • sulfur hexafluoride microsphr  2 mL intravenous Once in imaging   • triamterene-hydrochlorothiazid  1 tablet oral Daily   • warfarin  5 mg oral Daily     PRN medications   Medication   • acetaminophen    Or   • acetaminophen    Or   • acetaminophen   • melatonin   • ondansetron ODT    Or   • ondansetron     Continuous Medications   Medication Dose Last Rate     Outpatient Medications:  Current Outpatient Medications   Medication Instructions   • Banophen 25 mg capsule TAKE 1 CAPSULE BY MOUTH EVERY 4 HOURS AS NEEDED FOR UP TO 14 DAYS   • calcium carbonate-vitamin D3 500 mg-10 mcg (400 unit) chewable tablet CHEW AND SWALLOW ONE TABLET BY MOUTH TWO TIMES A DAY   • calcium citrate 250 mg calcium tablet 2 tablets, oral, 2 times daily   • cholecalciferol (VITAMIN D-3) 2,000 Units, oral   • clindamycin (Cleocin) 150 mg capsule oral   • clindamycin (Cleocin) 300 mg capsule take 2 capsules (600mg) by mouth once 1 hour prior to dental visit   • cyanocobalamin, vitamin B-12, 5,000 mcg capsule oral   • ezetimibe (ZETIA) 10 mg, oral, Daily RT   • fluticasone (Flonase) 50 mcg/actuation nasal spray 2 sprays, Each Nostril, Daily   •  "methylPREDNISolone (Medrol Dospak) 4 mg tablets TAKE BY MOUTH AS INSTRUCTED PER PACKAGE   • nadolol (Corgard) 20 mg tablet oral   • nadolol (CORGARD) 40 mg, oral, Daily   • oxyCODONE-acetaminophen (Percocet) 5-325 mg tablet oral   • pantoprazole (Protonix) 20 mg EC tablet oral   • pantoprazole (PROTONIX) 40 mg, oral, Every 12 hours   • pantoprazole (PROTONIX) 40 mg, oral, 2 times daily   • predniSONE (Deltasone) 20 mg tablet TAKE THREE (3) TABLETS BY MOUTH once EVERY DAY for 4 days   • rivaroxaban (Xarelto) 20 mg tablet oral   • silver sulfADIAZINE (SSD) 1 % cream SSD 1 % External Cream   Quantity: 50  Refills: 0        Start : 4-Apr-2018   Active   • topiramate (Topamax) 25 mg tablet oral   • triamterene-hydrochlorothiazid (Dyazide) 37.5-25 mg capsule 1 capsule, oral, Daily RT   • triamterene-hydrochlorothiazid (Maxzide) 75-50 mg tablet oral   • warfarin (Coumadin) 10 mg tablet Tale 1/2 tablet daily.       Physical Exam:  GENERAL: alert, cooperative, pleasant, in no acute distress  SKIN: warm and dry  NECK: Normal JVD, negative HJR  CARDIAC: Regular rate and rhythm with 2/6 mid peaking FRANCINE at base  CHEST: Normal respiratory efforts, lungs clear to auscultation bilaterally.  ABDOMEN: soft, nontender, nondistended  EXTREMITIES: Trace bilateral lower extremity edema with brawny discoloration, +2 palpable RP and DP pulses bilaterally    Tele reviewed- normal sinus rhythm HR 60s, with frequent PVCs     Assessment/Plan   Maddy Sanchez is a pleasant 68 year old female with significant past medical history of Paroxsymal atrial fibrillation, Remote DVT of right let, Hypertension, Hyperlipidemia, Obstructive sleep apnea, Morbid obesity, and anaphylactic rash to zio patch. She is followed by cardiologist Dr. Berry Pickard at [facility].     Bradycardia- Patient has sensation of feeling like she was \"floating\" yesterday. Reportedly nurse took pulse at which time   Paroxsymal atrial fibrillation- on Warfarin. Currently in NSR on tele. " No atrial fibrillation. Resume Nadolol. Defer monitor to outpatient cardiologist.   Hypertension-mildly elevated.   Hyperlipidemia- Follow up with primary cardiologist at [facility] as prior discussions have been had regarding benefits of statin therapy.  Chest pain- Pressure this morning along with fluttering. May be related to PVCs. Reportedly has been chronic and ongoing over the past couple years and unchanged. Check troponin. Ordered EKG. Checking Echo.     Close follow up with cardiologist Dr. Berry Pickard at [facility].        Code Status:  Full Code            Hilary Quezada, APRN-CNP

## 2024-05-06 NOTE — DISCHARGE INSTRUCTIONS
Follow up with your PCP and cardiologist at   Increase protonix to 40 mg twice a day. Discuss if you need repeat EGD with your pcp based on how symptoms progress with increased dose of protonix.

## 2024-05-06 NOTE — PROGRESS NOTES
05/06/24 1303   Discharge Planning   Living Arrangements Spouse/significant other   Support Systems Spouse/significant other;Family members   Assistance Needed None   Type of Residence Private residence   Number of Stairs to Enter Residence 7   Number of Stairs Within Residence 0   Do you have animals or pets at home? No   Home or Post Acute Services None   Patient expects to be discharged to: Home   Does the patient need discharge transport arranged? No   Financial Resource Strain   How hard is it for you to pay for the very basics like food, housing, medical care, and heating? Not hard   Housing Stability   In the last 12 months, was there a time when you were not able to pay the mortgage or rent on time? N   In the last 12 months, how many places have you lived? 1   In the last 12 months, was there a time when you did not have a steady place to sleep or slept in a shelter (including now)? N   Transportation Needs   In the past 12 months, has lack of transportation kept you from medical appointments or from getting medications? no   In the past 12 months, has lack of transportation kept you from meetings, work, or from getting things needed for daily living? No     5/6/24 1303  Met with patient at bedside to discuss discharge planning.  Patient lives at home with her fiance in an apartment.  She is independent with ADLs and drives at baseline.  She uses a cane or walker for ambulation.  She plans on returning home today and does not anticipate any discharge needs.  She will notify the [facility] should any needs arise.   Esme Dunbar RN [facility]

## 2024-05-07 LAB
ATRIAL RATE: 68 BPM
BACTERIA UR CULT: NORMAL
P AXIS: 63 DEGREES
P OFFSET: 178 MS
P ONSET: 106 MS
PR INTERVAL: 220 MS
Q ONSET: 216 MS
QRS COUNT: 11 BEATS
QRS DURATION: 158 MS
QT INTERVAL: 448 MS
QTC CALCULATION(BAZETT): 476 MS
QTC FREDERICIA: 467 MS
R AXIS: 57 DEGREES
T AXIS: 31 DEGREES
T OFFSET: 440 MS
VENTRICULAR RATE: 68 BPM

## 2024-05-07 NOTE — ED PROVIDER NOTES
HPI:    History provided by: Patient    This is a 68-year-old female past medical history of atrial fibrillation on Coumadin, hypertension, hyperlipidemia presenting to the emergency department for presyncope.  Patient was visiting her sister who is a patient in the emergency department.  She became extremely lightheaded, and felt like she was going to pass out.  Was flushed, did not have any chest pain, shortness of breath, nausea, or vomiting associated with this.  They obtain vital signs and noted her heart rate is 30, prompting them to check her in.  She is never had anything like this happen before.  She states that she has been compliant with her anticoagulation, and her medications for atrial fibrillation.  She has not had similar events like this occurring in the past.  Did not truly pass out.  No head strike, no headache, or blurry vision.        ROS: All pertinent positives and negatives reviewed in HPI    PMHx: Reviewed  PSHx: Reviewed  Social: no Etoh, no tobacco, no social drugs  Social Determinants of Health impacting care: NA  Allergies: Reviewed in EMR  FMHx: Noncontributory to patient's chief complaint  Medications: Reviewed        Vital signs and triage note reviewed per nursing documentation    Physical Exam:     GEN: Sitting in no acute distress. Well-appearing, appears stated age  HEAD: Atraumatic, normocephalic  EYES: EOMI. Pupils equal and reactive.   HEENT: MMM. No oropharyngeal erythema, exudates, or uvular deviation.   Neck: Supple, full ROM  CVS: Regular rate and rhythm, no murmurs, gallops, or rubs  PULM: Clear to auscultation bilaterally. No wheezes, rales, rhonchi.   ABD: Soft, nontender to palpation. No rigidity, guarding, or tympany  EXT: +2 radial and DP pulses bilaterally. No pitting edema noted. No varicose veins  NEURO: Alert, keenly responsive. Moving all 4 extremities spontaneously with normal strength. Normal sensation in all 4 extremities     Emergency Department  Course    Medications Ordered: IV LR    EKG: Is alternating normal QRSs with wide-complex QRSs.  Regular rate, irregular rhythm, normal axis.  ME normal, QRS prolonged on bigeminy beats.  No acute ST segment changes or T wave inversions.  Impression: Bigeminy with normal rate no ischemia.    MDM        68-year-old female past medical history of atrial fibrillation on Coumadin, hypertension, hyperlipidemia who presents to the emergency department for bradycardia and lightheadedness.  My assessment reveals a fairly well-appearing female who is lightheadedness has resolved prior to my assessment.  EKG demonstrates new onset bigeminy which is different than patient's baseline.  Will obtain laboratory testing to evaluate for electrolyte derangement, thyroid dysfunction, or infection as a precipitating cause of her bigeminy.  Workup at this time reassuring with no significant electrolyte derangement, normal cardiac testing so no concern for acute coronary syndrome, no evidence of infection at this time and CT head is negative.  However, the patient persistently is in bigeminy in the emergency department and given that she was initially bradycardic with symptomatic bradycardia almost leading to a syncopal event she will be admitted for further telemetry monitoring.      Consultations:    NA    Medications Prescribed:    NA    Disposition:    Admitted           Corky Chavez MD  05/07/24 [id number]

## 2024-05-09 NOTE — DISCHARGE SUMMARY
Discharge Diagnosis  Near syncope  Bradycardia  PAF  HTN  HLD  Chest pain  PE on coumadin    Issues Requiring Follow-Up  - op follow up with pcp, cardiology    Discharge Meds     Your medication list        CHANGE how you take these medications        Instructions Last Dose Given Next Dose Due   pantoprazole 40 mg EC tablet  Commonly known as: ProtoNix  What changed: when to take this      Take 1 tablet (40 mg) by mouth 2 times a day before meals.              CONTINUE taking these medications        Instructions Last Dose Given Next Dose Due   calcium carbonate-vitamin D3 500 mg-10 mcg (400 unit) chewable tablet           cholecalciferol 50 MCG (2000 UT) tablet  Commonly known as: Vitamin D-3           clindamycin 300 mg capsule  Commonly known as: Cleocin           cyanocobalamin (vitamin B-12) 5,000 mcg capsule           fluticasone 50 mcg/actuation nasal spray  Commonly known as: Flonase           nadolol 40 mg tablet  Commonly known as: Corgard           SSD 1 % cream  Generic drug: silver sulfADIAZINE           triamterene-hydrochlorothiazid 37.5-25 mg capsule  Commonly known as: Dyazide           warfarin 10 mg tablet  Commonly known as: Coumadin                     Where to Get Your Medications        These medications were sent to [id number] [place] [address]      Phone: [phone]   pantoprazole 40 mg EC tablet         Test Results Pending At Discharge  Pending Labs       No current pending labs.            Hospital Course  68 y.o. female presenting with dizziness, near syncope.Palpated HR was low though PVC's noted on telemetry consistent with a pseudobradycardia. Her symptoms self resolved. Otherwise has known aortic stenosis and is due for reassessment with her outpatient cardiologist next month. Also has had chest discomfort this morning that she describes as more of a fluttering sensation that she has had chronically (ecg  reviewed--unremarkable). Troponin negative. Plan to dc home with OP follow up with her cardiologist.    Pertinent Physical Exam At Time of Discharge  Physical Exam  Vitals and nursing note reviewed.   HENT:      Mouth/Throat:      Mouth: Mucous membranes are moist.      Pharynx: Oropharynx is clear.   Cardiovascular:      Rate and Rhythm: Normal rate and regular rhythm.   Pulmonary:      Effort: Pulmonary effort is normal.   Abdominal:      Palpations: Abdomen is soft.   Neurological:      General: No focal deficit present.      Mental Status: She is alert and oriented to person, place, and time.           Outpatient Follow-Up  Future Appointments   Date Time Provider Department Center   6/14/2024 11:15 AM Rob Flores MD [id number]     DISCHARGE TIME: > 30 minutes    Osito Starr DO

## 2024-06-14 ENCOUNTER — APPOINTMENT (OUTPATIENT)
Dept: ORTHOPEDIC SURGERY | Facility: CLINIC | Age: 69
End: 2024-06-14
Payer: COMMERCIAL

## 2024-06-14 DIAGNOSIS — M25.561 RIGHT KNEE PAIN, UNSPECIFIED CHRONICITY: ICD-10-CM

## 2024-07-22 NOTE — PROGRESS NOTES
History of Present Illness:  Chief Complaint   Patient presents with    Left Hand - Pain       The patient presents today for evaluation of left basilar thumb pain.  Symptoms began several months ago. She also has left index finger pain.  She notes stiffness and soreness..  Pain is worse with gripping, pinching and twisting.  The following treatments have been attempted: Anti-inflammatories and rest.    No numbness or tingling.      No past medical history on file.    Medication Documentation Review Audit       Reviewed by Osito Starr DO (Physician) on 05/06/24 at 1200      Medication Order Taking? Sig Documenting Provider Last Dose Status     Discontinued 05/06/24 1158   calcium carbonate-vitamin D3 500 mg-10 mcg (400 unit) chewable tablet [id number]  CHEW AND SWALLOW ONE TABLET BY MOUTH TWO TIMES A DAY Shara Arits MD  Active     Discontinued 05/06/24 1159   cholecalciferol (Vitamin D-3) 50 MCG (2000 UT) tablet [id number]  Take 1 tablet (2,000 Units) by mouth. Shara Artis MD  Active   clindamycin (Cleocin) 300 mg capsule [id number]  take 2 capsules (600mg) by mouth once 1 hour prior to dental visit Shara Artis MD  Active   cyanocobalamin, vitamin B-12, 5,000 mcg capsule [id number]  Take by mouth. Shara Artis MD  Active     Discontinued 05/06/24 1159   fluticasone (Flonase) 50 mcg/actuation nasal spray [id number]  Administer 2 sprays into each nostril once daily. Shara Artis MD  Active     Discontinued 05/06/24 1159   nadolol (Corgard) 40 mg tablet [id number]  Take 1 tablet (40 mg) by mouth once daily. Shara Artis MD  Active     Discontinued 05/06/24 1159   pantoprazole (ProtoNix) 40 mg EC tablet [id number]  Take 1 tablet (40 mg) by mouth once daily in the morning. Take before meals. Shara Artis MD  Active   silver sulfADIAZINE (SSD) 1 % cream [id number]  SSD 1 % External Cream   Quantity: 50  Refills: 0        Start : 4-Apr-2018   Active Shara Artis MD   Active     Discontinued 05/06/24 1200   triamterene-hydrochlorothiazid (Dyazide) 37.5-25 mg capsule [id number]  Take 1 capsule by mouth once daily. Historical Provider, MD  Active   warfarin (Coumadin) 10 mg tablet [id number] Yes Tale 1/2 tablet daily. Historical Provider, MD 5/4/2024 Active                    Allergies   Allergen Reactions    Atenolol Shortness of breath    Oycko-Euswz-Qivdait-Pramoxine Anaphylaxis    Nsaids (Non-Steroidal Anti-Inflammatory Drug) GI Upset     Bariatric surgery    Statins-Hmg-Coa Reductase Inhibitors Hives and Myalgia     CRAMPS, CRAMPS    Adhesive Tape-Silicones Hives     ZIO PATCH    Aspirin GI Upset and Rash     Other reaction(s): Other (See Comments)   12/02/2002;GI BLEED, 12/02/2002;GI BLEED    Latex, Natural Rubber Rash     Redness/rash       Social History     Socioeconomic History    Marital status: Single     Spouse name: Not on file    Number of children: Not on file    Years of education: Not on file    Highest education level: Not on file   Occupational History    Not on file   Tobacco Use    Smoking status: Never    Smokeless tobacco: Never   Substance and Sexual Activity    Alcohol use: Not on file    Drug use: Not on file    Sexual activity: Not on file   Other Topics Concern    Not on file   Social History Narrative    Not on file     Social Determinants of Health     Financial Resource Strain: Low Risk  (5/6/2024)    Overall Financial Resource Strain (CARDIA)     Difficulty of Paying Living Expenses: Not hard at all   Food Insecurity: No Food Insecurity (5/6/2024)    Hunger Vital Sign     Worried About Running Out of Food in the Last Year: Never true     Ran Out of Food in the Last Year: Never true   Transportation Needs: No Transportation Needs (5/6/2024)    PRAPARE - Transportation     Lack of Transportation (Medical): No     Lack of Transportation (Non-Medical): No   Physical Activity: Sufficiently Active (11/27/2022)    Received from [facility] Vital  Sign     Days of Exercise per Week: 3 days     Minutes of Exercise per Session: 100 min   Stress: No Stress Concern Present (11/27/2022)    Received from [facility]    [place] of Occupational Health - Occupational Stress Questionnaire     Feeling of Stress : Not at all   Social Connections: Unknown (5/6/2024)    Social Connection and Isolation Panel [NHANES]     Frequency of Communication with Friends and Family: Not on file     Frequency of Social Gatherings with Friends and Family: Not on file     Attends Restorationism Services: Not on file     Active Member of Clubs or Organizations: Not on file     Attends Club or Organization Meetings: Not on file     Marital Status: Living with partner   Intimate Partner Violence: Not At Risk (11/27/2022)    Received from [facility]    Humiliation, Afraid, Rape, and Kick questionnaire     Fear of Current or Ex-Partner: No     Emotionally Abused: No     Physically Abused: No     Sexually Abused: No   Housing Stability: Low Risk  (5/6/2024)    Housing Stability Vital Sign     Unable to Pay for Housing in the Last Year: No     Number of Places Lived in the Last Year: 1     Unstable Housing in the Last Year: No       No past surgical history on file.       Review of Systems   GENERAL: Negative for malaise, significant weight loss, fever  MUSCULOSKELETAL: see HPI  NEURO:  see HPI     Physical Examination:  Constitutional: Appears well-developed and well-nourished.  Head: Normocephalic and atraumatic.  Eyes: EOMI grossly  Cardiovascular: Intact distal pulses.   Respiratory: Effort normal. No respiratory distress.  Neurologic: Alert and oriented to person, place, and time.  Skin: Skin is warm and dry.  Hematologic / Lymphatic: No lymphedema, lymphangitis.  Psychiatric: normal mood and affect. Behavior is normal.   Musculoskeletal:  left wrist/hand:  No obvious swelling or masses  No thenar atrophy  No atrophy noted of hypothenar eminence   No Chanel's/Phalens  Sensation  grossly intact to all digits  Positive to the index finger MP and IP joints.  She has full range of motion with flexion extension.  She can make a composite fist to the distal palmar crease.  Tenderness about thumb cmc joint with positive CMC grind.  No tenderness about first dorsal compartment/thumb A1 pulley region  Radial pulse palpable  Good capillary refill     Radiographs: Outside x-rays dated 4/15/2024 results were reviewed.  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  We discussed treatments of thumb CMC arthritis.  Non-operative modalities include symptomatic splinting, anti-inflammatories, activity modifications, hand therapy and corticosteroid injections.  We also discussed role of surgical intervention if conservative measures prove unsuccessful.     Patient will continue with the anti-inflammatory medication.  She will also trial diclofenac gel.  She was given a thumb CMC brace.  She will follow-up as needed

## 2024-08-08 ENCOUNTER — OFFICE VISIT (OUTPATIENT)
Dept: ORTHOPEDIC SURGERY | Facility: CLINIC | Age: 69
End: 2024-08-08
Payer: COMMERCIAL

## 2024-08-08 ENCOUNTER — HOSPITAL ENCOUNTER (OUTPATIENT)
Dept: RADIOLOGY | Facility: CLINIC | Age: 69
Discharge: HOME | End: 2024-08-08
Payer: COMMERCIAL

## 2024-08-08 VITALS — WEIGHT: 293 LBS | BODY MASS INDEX: 53.92 KG/M2 | HEIGHT: 62 IN

## 2024-08-08 DIAGNOSIS — M25.561 RIGHT KNEE PAIN, UNSPECIFIED CHRONICITY: ICD-10-CM

## 2024-08-08 DIAGNOSIS — Z79.2 PROPHYLACTIC ANTIBIOTIC: ICD-10-CM

## 2024-08-08 DIAGNOSIS — M17.11 PRIMARY OSTEOARTHRITIS OF RIGHT KNEE: Primary | ICD-10-CM

## 2024-08-08 PROCEDURE — 99213 OFFICE O/P EST LOW 20 MIN: CPT | Performed by: ORTHOPAEDIC SURGERY

## 2024-08-08 PROCEDURE — 1125F AMNT PAIN NOTED PAIN PRSNT: CPT | Performed by: ORTHOPAEDIC SURGERY

## 2024-08-08 PROCEDURE — 3008F BODY MASS INDEX DOCD: CPT | Performed by: ORTHOPAEDIC SURGERY

## 2024-08-08 PROCEDURE — 73564 X-RAY EXAM KNEE 4 OR MORE: CPT | Mod: RT

## 2024-08-08 RX ORDER — AMOXICILLIN 500 MG/1
CAPSULE ORAL
Qty: 4 CAPSULE | Refills: 6 | Status: SHIPPED | OUTPATIENT
Start: 2024-08-08

## 2024-08-08 RX ORDER — NEBIVOLOL 5 MG/1
5 TABLET ORAL DAILY
COMMUNITY

## 2024-08-08 RX ORDER — FLECAINIDE ACETATE 100 MG/1
100 TABLET ORAL EVERY 12 HOURS
COMMUNITY

## 2024-08-08 RX ORDER — HYDROCHLOROTHIAZIDE 25 MG/1
25 TABLET ORAL DAILY
COMMUNITY

## 2024-08-08 ASSESSMENT — PAIN - FUNCTIONAL ASSESSMENT: PAIN_FUNCTIONAL_ASSESSMENT: 0-10

## 2024-08-08 ASSESSMENT — PAIN SCALES - GENERAL: PAINLEVEL_OUTOF10: 8

## 2024-08-09 DIAGNOSIS — Z96.651 S/P TKR (TOTAL KNEE REPLACEMENT) USING CEMENT, RIGHT: Primary | ICD-10-CM

## 2024-08-09 RX ORDER — CLINDAMYCIN HYDROCHLORIDE 300 MG/1
600 CAPSULE ORAL ONCE
Qty: 2 CAPSULE | Refills: 2 | Status: SHIPPED | OUTPATIENT
Start: 2024-08-09 | End: 2024-08-09

## 2024-08-13 NOTE — PROGRESS NOTES
Patient is a 68-year-old female presents today for evaluation of right knee osteoarthritis.  She had a left total knee replacement approximately 5 years ago which was complicated with a postoperative DVT.  She is tried Tylenol.  She cannot take anti-inflammatories secondary to history of bariatric surgery.  She has had previous injections with minimal relief.  She has significant venous stasis disease and vascular lymphedema.  She is morbidly obese with a BMI of 56.    Right knee:  AAOx3, NAD, walks with a moderate antalgic gait  Varus allignment  Range of motion lacks 10 degrees of full extension and flexes to 90 degrees  Stable to varus/valgus/anterior/posterior stress through out the range of motion  Slight laxity with varus stress  Diffuse medial  joint line tenderness to palpation  Moderate effusion  SILT in a beto/saph/per/tib distribution  5/5 knee extension/df/pf/ehl  ½ dorsalis pedis and posterior tibial pulse  no popliteal lymphadenopathy  Significant venous stasis changes  mood: euthymic  Respirations non labored    Plan films are reviewed by myself clinic today.  She is severe osteoarthritis of her right knee evidenced by significant joint space narrowing, underlying sclerosis, tricompartmental osteophytic change and significant varus deformity.    We discussed with her today that unfortunately due to her medical comorbidities of morbid obesity she is not a candidate for total knee replacement.  She would have to improve her overall health picture significantly and is a significant amount of weight before proceeding with total knee replacement.  All of her questions were answered.  She will follow-up with me on an as-needed basis.    This note was created using voice recognition software and was not corrected for typographical or grammatical errors.

## 2025-05-06 ENCOUNTER — OFFICE VISIT (OUTPATIENT)
Dept: ORTHOPEDIC SURGERY | Facility: HOSPITAL | Age: 70
End: 2025-05-06
Payer: COMMERCIAL

## 2025-05-06 DIAGNOSIS — M65.332 TRIGGER FINGER, LEFT MIDDLE FINGER: Primary | ICD-10-CM

## 2025-05-06 DIAGNOSIS — M65.322 TRIGGER FINGER, LEFT INDEX FINGER: ICD-10-CM

## 2025-05-06 PROCEDURE — 20550 NJX 1 TENDON SHEATH/LIGAMENT: CPT | Mod: LT | Performed by: STUDENT IN AN ORGANIZED HEALTH CARE EDUCATION/TRAINING PROGRAM

## 2025-05-06 PROCEDURE — 99212 OFFICE O/P EST SF 10 MIN: CPT | Mod: 25 | Performed by: STUDENT IN AN ORGANIZED HEALTH CARE EDUCATION/TRAINING PROGRAM

## 2025-05-06 PROCEDURE — 2500000004 HC RX 250 GENERAL PHARMACY W/ HCPCS (ALT 636 FOR OP/ED): Performed by: STUDENT IN AN ORGANIZED HEALTH CARE EDUCATION/TRAINING PROGRAM

## 2025-05-06 RX ORDER — BETAMETHASONE SODIUM PHOSPHATE AND BETAMETHASONE ACETATE 3; 3 MG/ML; MG/ML
6 INJECTION, SUSPENSION INTRA-ARTICULAR; INTRALESIONAL; INTRAMUSCULAR; SOFT TISSUE
Status: COMPLETED | OUTPATIENT
Start: 2025-05-06 | End: 2025-05-06

## 2025-05-06 RX ORDER — LIDOCAINE HYDROCHLORIDE 10 MG/ML
0.5 INJECTION, SOLUTION INFILTRATION; PERINEURAL
Status: COMPLETED | OUTPATIENT
Start: 2025-05-06 | End: 2025-05-06

## 2025-05-06 RX ADMIN — BETAMETHASONE SODIUM PHOSPHATE AND BETAMETHASONE ACETATE 6 MG: 3; 3 INJECTION, SUSPENSION INTRA-ARTICULAR; INTRALESIONAL; INTRAMUSCULAR at 13:31

## 2025-05-06 RX ADMIN — LIDOCAINE HYDROCHLORIDE 0.5 ML: 10 INJECTION, SOLUTION INFILTRATION; PERINEURAL at 13:32

## 2025-05-06 RX ADMIN — BETAMETHASONE SODIUM PHOSPHATE AND BETAMETHASONE ACETATE 6 MG: 3; 3 INJECTION, SUSPENSION INTRA-ARTICULAR; INTRALESIONAL; INTRAMUSCULAR at 13:32

## 2025-05-06 RX ADMIN — LIDOCAINE HYDROCHLORIDE 0.5 ML: 10 INJECTION, SOLUTION INFILTRATION; PERINEURAL at 13:31

## 2025-05-06 ASSESSMENT — PAIN - FUNCTIONAL ASSESSMENT: PAIN_FUNCTIONAL_ASSESSMENT: 0-10

## 2025-05-06 ASSESSMENT — PAIN SCALES - GENERAL: PAINLEVEL_OUTOF10: 6

## 2025-05-06 NOTE — PROGRESS NOTES
History of Present Illness:  Patient presents today for evaluation of side: left index and long finger pain. Endorses catching of the digit with flexion to the index finger and lesser extent the long finger. Denies any traumatic event or injury. The patient notes that it is worse with periods of disuse and in the morning and better when warmed up.  The patient endorses localized, focal pain when it catches / sharp in nature.  The patient denies any numbness and tingling.    I previously given her a corticosteroid injection for her left thumb CMC arthritis approximately 1 year ago.  She is continue to have good relief with this.  Denies any significant basilar thumb pain on today's visit.    Medical History[1]    Medication Documentation Review Audit       Reviewed by Jia Wisdom LPN (Licensed Nurse) on 05/06/25 at 1305      Medication Order Taking? Sig Documenting Provider Last Dose Status   amoxicillin (Amoxil) 500 mg capsule [id number]  Take 4 Tablets 1 Hour Prior to Dental Procedure or Cleaning. Rob Flores MD  Active   calcium carbonate-vitamin D3 500 mg-10 mcg (400 unit) chewable tablet [id number]  CHEW AND SWALLOW ONE TABLET BY MOUTH TWO TIMES A DAY Historical Provider, MD  Active   cholecalciferol (Vitamin D-3) 50 MCG (2000 UT) tablet [id number]  Take 1 tablet (2,000 Units) by mouth. Historical Provider, MD  Active   cyanocobalamin, vitamin B-12, 5,000 mcg capsule [id number]  Take by mouth. Historical Provider, MD  Active   flecainide (Tambocor) 100 mg tablet [id number]  Take 1 tablet (100 mg) by mouth every 12 hours. Historical Provider, MD  Active   fluticasone (Flonase) 50 mcg/actuation nasal spray [id number]  Administer 2 sprays into each nostril once daily. Historical Provider, MD  Active   hydroCHLOROthiazide (HYDRODiuril) 25 mg tablet [id number]  Take 1 tablet (25 mg) by mouth once daily. Historical Provider, MD  Active   nadolol (Corgard) 40 mg tablet [id number]  Take 1 tablet (40 mg) by mouth  once daily. Historical Provider, MD  Active   nebivolol (Bystolic) 5 mg tablet [id number]  Take 1 tablet (5 mg) by mouth once daily. At bedtime Historical Provider, MD  Active   pantoprazole (ProtoNix) 40 mg EC tablet [id number]  Take 1 tablet (40 mg) by mouth 2 times a day before meals. Deep Starr, DO   24 2359   silver sulfADIAZINE (SSD) 1 % cream [id number]  SSD 1 % External Cream   Quantity: 50  Refills: 0        Start : 2018   Active Historical Provider, MD  Active   triamterene-hydrochlorothiazid (Dyazide) 37.5-25 mg capsule [id number]  Take 1 capsule by mouth once daily. Historical Provider, MD  Active   warfarin (Coumadin) 10 mg tablet [id number] No Tale 1/2 tablet daily. Historical Provider, MD 2024 Active                    RX Allergies[2]    Social History     Socioeconomic History    Marital status: Single     Spouse name: Not on file    Number of children: Not on file    Years of education: Not on file    Highest education level: Not on file   Occupational History    Not on file   Tobacco Use    Smoking status: Never    Smokeless tobacco: Never   Substance and Sexual Activity    Alcohol use: Not on file    Drug use: Not on file    Sexual activity: Not on file   Other Topics Concern    Not on file   Social History Narrative    Not on file     Social Drivers of Health     Financial Resource Strain: Low Risk  (2024)    Overall Financial Resource Strain (CARDIA)     Difficulty of Paying Living Expenses: Not hard at all   Food Insecurity: No Food Insecurity (2024)    Received from [facility]    Hunger Vital Sign     Worried About Running Out of Food in the Last Year: Never true     Ran Out of Food in the Last Year: Never true   Transportation Needs: No Transportation Needs (2024)    Received from [facility]    PRAPARE - Transportation     Lack of Transportation (Medical): No     Lack of Transportation (Non-Medical): No   Physical Activity: Sufficiently Active  (11/27/2022)    Received from [employer]    Exercise Vital Sign     Days of Exercise per Week: 3 days     Minutes of Exercise per Session: 100 min   Stress: No Stress Concern Present (11/27/2022)    Received from [employer]    Citizen of [place] of Occupational Health - Occupational Stress Questionnaire     Feeling of Stress : Not at all   Social Connections: Unknown (5/6/2024)    Social Connection and Isolation Panel [NHANES]     Frequency of Communication with Friends and Family: Not on file     Frequency of Social Gatherings with Friends and Family: Not on file     Attends Zoroastrianism Services: Not on file     Active Member of Clubs or Organizations: Not on file     Attends Club or Organization Meetings: Not on file     Marital Status: Living with partner   Intimate Partner Violence: Not At Risk (11/27/2022)    Received from [employer]    Humiliation, Afraid, Rape, and Kick questionnaire     Fear of Current or Ex-Partner: No     Emotionally Abused: No     Physically Abused: No     Sexually Abused: No   Housing Stability: Low Risk  (5/6/2024)    Housing Stability Vital Sign     Unable to Pay for Housing in the Last Year: No     Number of Places Lived in the Last Year: 1     Unstable Housing in the Last Year: No       Surgical History[3]       Review of Systems   GENERAL: Negative for malaise, significant weight loss, fever  MUSCULOSKELETAL: see HPI  NEURO:  Negative     Physical Examination  side: left hand index and long finger:  No obvious atrophy, no skin changes  Tenderness, palpable nodule along the flexor tendon sheath with passive ROM  Positive triggering with active flexion and extension to the index finger and some mild catching to the long finger.  No subluxation of the extensor tendon appreciated over the MP joint.  Normal neurovascular exam distally     Imaging: No new images, previous left hand images dated 4/15/2024 results were reviewed.  These are outside images and I do not have access to viewing  them.  There is left thumb CMC arthritis and DIP joint arthritis.     Assessment:  Patient with side: lefthand index and long digit trigger finger     Plan:  We reviewed the disease process with the patient. I explained the etiology of trigger finger. As the flexor tendons enter the flexor tendon sheath at the level of the A1 pulley, there is a size mismatch. Due to many years of use, the tendon has become inflamed or the sheath has become inflamed and it no longer easily glides into the tunnel.   We discussed the role for injections. The majority of patients (60-80%) will achieve symptom relief with corticosteroid injection, although sometimes it takes more than one. Patients with diabetes or symptoms for longer than 6 months have a lower chance of successful resolution of symptoms with injections. Patients who are not satisfied with symptom relief from injections may be candidates for trigger finger release.  We discussed surgical intervention reviewing the risks (bleeding, neurologic injury, wound issues including infection, and recurrence) and benefits.  The patient elected for: Corticosteroid injection for the left index and long trigger fingers.    Hand / UE Inj/Asp: L index A1 for trigger finger on 5/6/2025 1:31 PM  Indications: therapeutic, pain and tendon swelling  Details: 25 G needle, volar approach  Medications: 6 mg betamethasone acet,sod phos 6 mg/mL; 0.5 mL lidocaine 10 mg/mL (1 %)  Outcome: tolerated well, no immediate complications  Procedure, treatment alternatives, risks and benefits explained, specific risks discussed. Consent was given by the patient. Immediately prior to procedure a time out was called to verify the correct patient, procedure, equipment, support staff and site/side marked as required. Patient was prepped and draped in the usual sterile fashion.       Hand / UE Inj/Asp: L long A1 for trigger finger on 5/6/2025 1:32 PM  Indications: therapeutic, pain and tendon swelling  Details:  25 G needle, volar approach  Medications: 6 mg betamethasone acet,sod phos 6 mg/mL; 0.5 mL lidocaine 10 mg/mL (1 %)  Outcome: tolerated well, no immediate complications  Procedure, treatment alternatives, risks and benefits explained, specific risks discussed. Consent was given by the patient. Immediately prior to procedure a time out was called to verify the correct patient, procedure, equipment, support staff and site/side marked as required. Patient was prepped and draped in the usual sterile fashion.                            [1] No past medical history on file.  [2]   Allergies  Allergen Reactions    Atenolol Shortness of breath    Ckqka-Sommt-Lhrymnt-Pramoxine Anaphylaxis    Nsaids (Non-Steroidal Anti-Inflammatory Drug) GI Upset     Bariatric surgery    Penicillin Unknown    Statins-Hmg-Coa Reductase Inhibitors Hives and Myalgia     CRAMPS, CRAMPS    Adhesive Tape-Silicones Hives     ZIO PATCH    Aspirin GI Upset and Rash     Other reaction(s): Other (See Comments)   12/02/2002;GI BLEED, 12/02/2002;GI BLEED    Latex, Natural Rubber Rash     Redness/rash   [3] No past surgical history on file.